# Patient Record
Sex: MALE | Race: WHITE | Employment: FULL TIME | URBAN - METROPOLITAN AREA
[De-identification: names, ages, dates, MRNs, and addresses within clinical notes are randomized per-mention and may not be internally consistent; named-entity substitution may affect disease eponyms.]

---

## 2017-01-30 ENCOUNTER — TELEPHONE (OUTPATIENT)
Dept: INTERNAL MEDICINE CLINIC | Facility: CLINIC | Age: 37
End: 2017-01-30

## 2017-01-30 DIAGNOSIS — Z13.220 SCREENING FOR LIPID DISORDERS: ICD-10-CM

## 2017-01-30 DIAGNOSIS — Z13.228 SCREENING FOR METABOLIC DISORDER: ICD-10-CM

## 2017-01-30 DIAGNOSIS — Z13.29 SCREENING FOR THYROID DISORDER: ICD-10-CM

## 2017-01-30 DIAGNOSIS — Z00.00 ROUTINE GENERAL MEDICAL EXAMINATION AT A HEALTH CARE FACILITY: Primary | ICD-10-CM

## 2017-01-30 DIAGNOSIS — Z13.0 SCREENING FOR DISORDER OF BLOOD AND BLOOD-FORMING ORGANS: ICD-10-CM

## 2017-02-21 ENCOUNTER — LAB ENCOUNTER (OUTPATIENT)
Dept: LAB | Age: 37
End: 2017-02-21
Attending: INTERNAL MEDICINE
Payer: COMMERCIAL

## 2017-02-21 ENCOUNTER — OFFICE VISIT (OUTPATIENT)
Dept: INTERNAL MEDICINE CLINIC | Facility: CLINIC | Age: 37
End: 2017-02-21

## 2017-02-21 VITALS
RESPIRATION RATE: 12 BRPM | TEMPERATURE: 98 F | SYSTOLIC BLOOD PRESSURE: 114 MMHG | WEIGHT: 166 LBS | BODY MASS INDEX: 23.24 KG/M2 | DIASTOLIC BLOOD PRESSURE: 68 MMHG | HEIGHT: 71 IN | HEART RATE: 68 BPM

## 2017-02-21 DIAGNOSIS — Z13.228 SCREENING FOR METABOLIC DISORDER: ICD-10-CM

## 2017-02-21 DIAGNOSIS — Z00.00 ROUTINE GENERAL MEDICAL EXAMINATION AT A HEALTH CARE FACILITY: ICD-10-CM

## 2017-02-21 DIAGNOSIS — Z30.09 VASECTOMY EVALUATION: ICD-10-CM

## 2017-02-21 DIAGNOSIS — Z13.0 SCREENING FOR DISORDER OF BLOOD AND BLOOD-FORMING ORGANS: ICD-10-CM

## 2017-02-21 DIAGNOSIS — Z13.220 SCREENING FOR LIPID DISORDERS: ICD-10-CM

## 2017-02-21 DIAGNOSIS — Z13.29 SCREENING FOR THYROID DISORDER: ICD-10-CM

## 2017-02-21 DIAGNOSIS — Z00.00 PE (PHYSICAL EXAM), ANNUAL: Primary | ICD-10-CM

## 2017-02-21 LAB
ALBUMIN SERPL-MCNC: 3.9 G/DL (ref 3.5–4.8)
ALP LIVER SERPL-CCNC: 62 U/L (ref 45–117)
ALT SERPL-CCNC: 57 U/L (ref 17–63)
AST SERPL-CCNC: 26 U/L (ref 15–41)
BASOPHILS # BLD AUTO: 0.05 X10(3) UL (ref 0–0.1)
BASOPHILS NFR BLD AUTO: 0.9 %
BILIRUB SERPL-MCNC: 0.5 MG/DL (ref 0.1–2)
BUN BLD-MCNC: 11 MG/DL (ref 8–20)
CALCIUM BLD-MCNC: 9.1 MG/DL (ref 8.3–10.3)
CHLORIDE: 106 MMOL/L (ref 101–111)
CHOLEST SMN-MCNC: 183 MG/DL (ref ?–200)
CO2: 31 MMOL/L (ref 22–32)
CREAT BLD-MCNC: 0.91 MG/DL (ref 0.7–1.3)
EOSINOPHIL # BLD AUTO: 0.23 X10(3) UL (ref 0–0.3)
EOSINOPHIL NFR BLD AUTO: 3.9 %
ERYTHROCYTE [DISTWIDTH] IN BLOOD BY AUTOMATED COUNT: 13.2 % (ref 11.5–16)
GLUCOSE BLD-MCNC: 94 MG/DL (ref 70–99)
HCT VFR BLD AUTO: 42.5 % (ref 37–53)
HDLC SERPL-MCNC: 60 MG/DL (ref 45–?)
HDLC SERPL: 3.05 {RATIO} (ref ?–4.97)
HGB BLD-MCNC: 14.1 G/DL (ref 13–17)
IMMATURE GRANULOCYTE COUNT: 0.03 X10(3) UL (ref 0–1)
IMMATURE GRANULOCYTE RATIO %: 0.5 %
LDLC SERPL CALC-MCNC: 105 MG/DL (ref ?–130)
LYMPHOCYTES # BLD AUTO: 2.07 X10(3) UL (ref 0.9–4)
LYMPHOCYTES NFR BLD AUTO: 35.4 %
M PROTEIN MFR SERPL ELPH: 7.4 G/DL (ref 6.1–8.3)
MCH RBC QN AUTO: 30.1 PG (ref 27–33.2)
MCHC RBC AUTO-ENTMCNC: 33.2 G/DL (ref 31–37)
MCV RBC AUTO: 90.8 FL (ref 80–99)
MONOCYTES # BLD AUTO: 0.59 X10(3) UL (ref 0.1–0.6)
MONOCYTES NFR BLD AUTO: 10.1 %
NEUTROPHIL ABS PRELIM: 2.87 X10 (3) UL (ref 1.3–6.7)
NEUTROPHILS # BLD AUTO: 2.87 X10(3) UL (ref 1.3–6.7)
NEUTROPHILS NFR BLD AUTO: 49.2 %
NONHDLC SERPL-MCNC: 123 MG/DL (ref ?–130)
PLATELET # BLD AUTO: 254 10(3)UL (ref 150–450)
POTASSIUM SERPL-SCNC: 4.3 MMOL/L (ref 3.6–5.1)
RBC # BLD AUTO: 4.68 X10(6)UL (ref 4.3–5.7)
RED CELL DISTRIBUTION WIDTH-SD: 43.7 FL (ref 35.1–46.3)
SODIUM SERPL-SCNC: 142 MMOL/L (ref 136–144)
TRIGLYCERIDES: 91 MG/DL (ref ?–150)
TSI SER-ACNC: 2.94 MIU/ML (ref 0.35–5.5)
VLDL: 18 MG/DL (ref 5–40)
WBC # BLD AUTO: 5.8 X10(3) UL (ref 4–13)

## 2017-02-21 PROCEDURE — 80053 COMPREHEN METABOLIC PANEL: CPT

## 2017-02-21 PROCEDURE — 99395 PREV VISIT EST AGE 18-39: CPT | Performed by: INTERNAL MEDICINE

## 2017-02-21 PROCEDURE — 84443 ASSAY THYROID STIM HORMONE: CPT

## 2017-02-21 PROCEDURE — 80061 LIPID PANEL: CPT

## 2017-02-21 PROCEDURE — 85025 COMPLETE CBC W/AUTO DIFF WBC: CPT

## 2017-02-21 NOTE — PROGRESS NOTES
Patient presents with:  Physical      HPI:  Here for cpe. Has intermittent let wirst pain with repetitive use, started 4 mos ago, has not bothered him for a month, no swelling, redness or warmth.      Review of Systems   Constitutional: Negative for fever, DTAP                  10/14/2014      Influenza             10/15/2014        Physical Exam  /68 mmHg  Pulse 68  Temp(Src) 97.8 °F (36.6 °C) (Oral)  Resp 12  Ht 71\"  Wt 166 lb  BMI 23.16 kg/m2  Constitutional: Oriented to person, place, and time.  No

## 2017-06-30 PROBLEM — Z30.09 VASECTOMY EVALUATION: Status: ACTIVE | Noted: 2017-06-30

## 2017-10-24 ENCOUNTER — TELEPHONE (OUTPATIENT)
Dept: INTERNAL MEDICINE CLINIC | Facility: CLINIC | Age: 37
End: 2017-10-24

## 2017-10-24 NOTE — TELEPHONE ENCOUNTER
Pt called back said date of CPE was fine to use on the form.     Pls fax to number on bottom of form

## 2017-10-24 NOTE — TELEPHONE ENCOUNTER
Received Physician Screening Form for lab results, height, weight, B/P. Last PE and labs was 2/21/17. LM for pt to call back to let us know if this is the date that should be used on the form. Form in [facility] triage folder.

## 2017-11-06 ENCOUNTER — TELEPHONE (OUTPATIENT)
Dept: INTERNAL MEDICINE CLINIC | Facility: CLINIC | Age: 37
End: 2017-11-06

## 2017-11-06 NOTE — TELEPHONE ENCOUNTER
Would like to discuss a foot injury, injured his foot running. Not getting any better. Pt did not want to make an appointment with [facility].  Last OV 2/2017

## 2017-11-06 NOTE — TELEPHONE ENCOUNTER
Pt states he was running 4 or so weeks ago, rolled his ankle, area last two toes, side of , has felt sharp pains in the foot with certain movements. Patient referred to Justyna Parnell MD and verbalizes understanding.

## 2017-11-17 ENCOUNTER — TELEPHONE (OUTPATIENT)
Dept: INTERNAL MEDICINE CLINIC | Facility: CLINIC | Age: 37
End: 2017-11-17

## 2017-11-17 NOTE — TELEPHONE ENCOUNTER
Pt stating no c/o any fever at this time. No N/V. No HA. No dizziness. No light sensitivity. Pt states may have slept on it wrong while traveling. Has only tried ibuprofen with no improvement.  Advised may try alternating w/ tylenol for additional pain cont

## 2017-11-17 NOTE — TELEPHONE ENCOUNTER
Patient has developed some neck stiffness over this week. He is scheduled with [facility] on Monday but would like to talk to a nurse to see what he can do over the weekend or if he could go to a walk in.

## 2017-12-15 PROBLEM — M76.71 PERONEAL TENDINITIS OF RIGHT LOWER EXTREMITY: Status: ACTIVE | Noted: 2017-12-15

## 2019-10-08 ENCOUNTER — OFFICE VISIT (OUTPATIENT)
Dept: INTERNAL MEDICINE CLINIC | Facility: CLINIC | Age: 39
End: 2019-10-08
Payer: COMMERCIAL

## 2019-10-08 VITALS
HEIGHT: 72.05 IN | SYSTOLIC BLOOD PRESSURE: 116 MMHG | WEIGHT: 171 LBS | DIASTOLIC BLOOD PRESSURE: 64 MMHG | OXYGEN SATURATION: 98 % | BODY MASS INDEX: 23.16 KG/M2 | HEART RATE: 64 BPM | TEMPERATURE: 98 F | RESPIRATION RATE: 16 BRPM

## 2019-10-08 DIAGNOSIS — Z13.220 SCREENING CHOLESTEROL LEVEL: ICD-10-CM

## 2019-10-08 DIAGNOSIS — Z00.00 LABORATORY EXAMINATION ORDERED AS PART OF A ROUTINE GENERAL MEDICAL EXAMINATION: ICD-10-CM

## 2019-10-08 DIAGNOSIS — Z00.00 PE (PHYSICAL EXAM), ANNUAL: Primary | ICD-10-CM

## 2019-10-08 PROCEDURE — 90686 IIV4 VACC NO PRSV 0.5 ML IM: CPT | Performed by: INTERNAL MEDICINE

## 2019-10-08 PROCEDURE — 99395 PREV VISIT EST AGE 18-39: CPT | Performed by: INTERNAL MEDICINE

## 2019-10-08 PROCEDURE — 90471 IMMUNIZATION ADMIN: CPT | Performed by: INTERNAL MEDICINE

## 2019-10-09 NOTE — PROGRESS NOTES
Patient presents with:  Physical: RG rm 8 Physical, right wrist issues      HPI:  Here for cpe. Amanalee Orozco on right wrsit 3 mos ago, still with some intermittent pain there. No swelling, redness or warmth, hurts with lifitng heavy objects. Normal rom per pt. file.    Allergies  No Known Allergies    Health Maintenance  Immunizations:    Immunization History  Administered            Date(s) Administered    DTAP                  10/14/2014      FLULAVAL 6 months & older 0.5 ml Prefilled syringe (33468) Lipid      TSH W Reflex To Free T4      Flulaval 6 months and older 0.5 ml Quad PF [15339]      Meds & Refills for this Visit:  Requested Prescriptions      No prescriptions requested or ordered in this encounter       Imaging & Consults:  FLULAVAL INFL

## 2019-10-10 ENCOUNTER — APPOINTMENT (OUTPATIENT)
Dept: LAB | Age: 39
End: 2019-10-10
Attending: INTERNAL MEDICINE
Payer: COMMERCIAL

## 2019-10-14 DIAGNOSIS — R79.89 ELEVATED TSH: Primary | ICD-10-CM

## 2021-03-04 ENCOUNTER — TELEPHONE (OUTPATIENT)
Dept: INTERNAL MEDICINE CLINIC | Facility: CLINIC | Age: 41
End: 2021-03-04

## 2021-03-04 DIAGNOSIS — Z13.228 SCREENING FOR METABOLIC DISORDER: ICD-10-CM

## 2021-03-04 DIAGNOSIS — Z00.00 ROUTINE GENERAL MEDICAL EXAMINATION AT A HEALTH CARE FACILITY: Primary | ICD-10-CM

## 2021-03-04 DIAGNOSIS — Z13.0 SCREENING FOR DISORDER OF BLOOD AND BLOOD-FORMING ORGANS: ICD-10-CM

## 2021-03-04 DIAGNOSIS — Z13.29 SCREENING FOR THYROID DISORDER: ICD-10-CM

## 2021-03-04 DIAGNOSIS — Z13.220 SCREENING FOR LIPID DISORDERS: ICD-10-CM

## 2021-03-04 NOTE — TELEPHONE ENCOUNTER
[facility] to [facility]- Pt informed that labs need to be completed no sooner than 2 weeks prior to the appt.  Pt aware to fast-no call back required      Future Appointments   Date Time Provider Kumar Groves   5/21/2021  7:30 AM Seble Torres MD [facility]

## 2021-03-08 ENCOUNTER — ORDER TRANSCRIPTION (OUTPATIENT)
Dept: ADMINISTRATIVE | Facility: HOSPITAL | Age: 41
End: 2021-03-08

## 2021-03-08 DIAGNOSIS — Z13.9 ENCOUNTER FOR SCREENING: Primary | ICD-10-CM

## 2021-04-01 ENCOUNTER — ORDER TRANSCRIPTION (OUTPATIENT)
Dept: ADMINISTRATIVE | Facility: HOSPITAL | Age: 41
End: 2021-04-01

## 2021-04-01 DIAGNOSIS — R93.1 ABNORMAL SCREENING CARDIAC CT: Primary | ICD-10-CM

## 2021-04-02 ENCOUNTER — HOSPITAL ENCOUNTER (OUTPATIENT)
Dept: CT IMAGING | Facility: HOSPITAL | Age: 41
Discharge: HOME | End: 2021-04-02
Attending: INTERNAL MEDICINE

## 2021-04-02 DIAGNOSIS — Z13.9 ENCOUNTER FOR SCREENING: ICD-10-CM

## 2021-04-02 DIAGNOSIS — R93.1 ABNORMAL SCREENING CARDIAC CT: ICD-10-CM

## 2021-05-21 ENCOUNTER — OFFICE VISIT (OUTPATIENT)
Dept: INTERNAL MEDICINE CLINIC | Facility: CLINIC | Age: 41
End: 2021-05-21
Payer: COMMERCIAL

## 2021-05-21 VITALS
WEIGHT: 177.63 LBS | TEMPERATURE: 97 F | SYSTOLIC BLOOD PRESSURE: 110 MMHG | BODY MASS INDEX: 24.06 KG/M2 | RESPIRATION RATE: 18 BRPM | HEIGHT: 72.05 IN | DIASTOLIC BLOOD PRESSURE: 74 MMHG

## 2021-05-21 DIAGNOSIS — Z00.00 PE (PHYSICAL EXAM), ANNUAL: Primary | ICD-10-CM

## 2021-05-21 PROCEDURE — 3078F DIAST BP <80 MM HG: CPT | Performed by: INTERNAL MEDICINE

## 2021-05-21 PROCEDURE — 3008F BODY MASS INDEX DOCD: CPT | Performed by: INTERNAL MEDICINE

## 2021-05-21 PROCEDURE — 99396 PREV VISIT EST AGE 40-64: CPT | Performed by: INTERNAL MEDICINE

## 2021-05-21 PROCEDURE — 3074F SYST BP LT 130 MM HG: CPT | Performed by: INTERNAL MEDICINE

## 2021-05-21 NOTE — PROGRESS NOTES
Patient presents with:  Wellness Visit: MR rm 9 annual pe       HPI:  Here for cpe. Review of Systems   Constitutional: Negative for fever, chills and fatigue. No distress.   HENT: Negative for hearing loss, congestion, sore throat, neck pain and dental 100 mcg/0.5 ml                          03/24/2021 04/21/2021      DTAP                  10/14/2014      FLULAVAL 6 months & older 0.5 ml Prefilled syringe ([id number])                          10/08/2019      Influenza             10/15/2014      Physical Exa the patient understands the plan.

## 2021-11-05 ENCOUNTER — IMMUNIZATION (OUTPATIENT)
Dept: LAB | Facility: HOSPITAL | Age: 41
End: 2021-11-05
Attending: EMERGENCY MEDICINE
Payer: COMMERCIAL

## 2021-11-05 DIAGNOSIS — Z23 NEED FOR VACCINATION: Primary | ICD-10-CM

## 2021-11-05 PROCEDURE — 0064A SARSCOV2 VAC 50MCG/0.25ML IM: CPT

## 2021-12-21 ENCOUNTER — APPOINTMENT (OUTPATIENT)
Dept: GENERAL RADIOLOGY | Age: 41
End: 2021-12-21
Attending: PHYSICIAN ASSISTANT
Payer: COMMERCIAL

## 2021-12-21 ENCOUNTER — HOSPITAL ENCOUNTER (OUTPATIENT)
Age: 41
Discharge: HOME | End: 2021-12-21
Payer: COMMERCIAL

## 2021-12-21 ENCOUNTER — TELEPHONE (OUTPATIENT)
Dept: INTERNAL MEDICINE CLINIC | Facility: CLINIC | Age: 41
End: 2021-12-21

## 2021-12-21 VITALS
TEMPERATURE: 98 F | BODY MASS INDEX: 23.7 KG/M2 | HEART RATE: 84 BPM | HEIGHT: 72 IN | SYSTOLIC BLOOD PRESSURE: 132 MMHG | OXYGEN SATURATION: 99 % | WEIGHT: 175 LBS | RESPIRATION RATE: 18 BRPM | DIASTOLIC BLOOD PRESSURE: 74 MMHG

## 2021-12-21 DIAGNOSIS — S20.211A RIB CONTUSION, RIGHT, INITIAL ENCOUNTER: Primary | ICD-10-CM

## 2021-12-21 PROCEDURE — 71101 X-RAY EXAM UNILAT RIBS/CHEST: CPT | Performed by: PHYSICIAN ASSISTANT

## 2021-12-21 PROCEDURE — 99203 OFFICE O/P NEW LOW 30 MIN: CPT

## 2021-12-21 PROCEDURE — 99213 OFFICE O/P EST LOW 20 MIN: CPT

## 2021-12-21 RX ORDER — IBUPROFEN 600 MG/1
600 TABLET ORAL ONCE
Status: COMPLETED | OUTPATIENT
Start: 2021-12-21 | End: 2021-12-21

## 2021-12-21 RX ORDER — LIDOCAINE 50 MG/G
1 PATCH TOPICAL EVERY 24 HOURS
Qty: 5 PATCH | Refills: 0 | Status: SHIPPED | OUTPATIENT
Start: 2021-12-21 | End: 2022-01-05

## 2021-12-21 NOTE — ED PROVIDER NOTES
Patient Seen in: [facility]      History   Patient presents with:   Other: righ ribcage pain     Stated Complaint: sharp pain,ribs right side    Subjective:   HPI    49-year-old male here with complaint of pain and swelling along the base Normocephalic.       Right Ear: External ear normal.      Left Ear: External ear normal.      Nose: Nose normal.      Mouth/Throat:      Mouth: Mucous membranes are moist.   Eyes:      Conjunctiva/sclera: Conjunctivae normal.      Pupils: Pupils are equal, personally reviewed the xray images and radiology report and discussed the findings with the patient : . No acute displaced right rib fracture              MDM     Clinical Impression: R sided rib contusion  Course of Treatment: Take Motrin every 6 hours a

## 2021-12-21 NOTE — TELEPHONE ENCOUNTER
Patient states over the weekend having right sided rib pain after playing with kids (football, trampoline) was pretty active this weekend. Sunday noticed sharp pain with movement in localized location, can pinpoint tenderness/pain. Patient states pain when taking a deep breath. No SOB or cp. Patient speaking in full sentences during call. Patient reports 5/10 at this time, has been trying ice, heat, NSAIDS with minimal improvement. Patient states he is breathing more shallow to help reduce the pain. Patient recommended to be seen in [facility] for evaluation and imaging. Notified risk of developing pneumonia if not breathing properly. Patient agreeable to [facility] at this time.  Patient is due for NSAID dosing at this time and will redose prior to being seen in [facility]

## 2022-12-11 ENCOUNTER — HOSPITAL ENCOUNTER (OUTPATIENT)
Age: 42
Discharge: HOME | End: 2022-12-11
Payer: COMMERCIAL

## 2022-12-11 VITALS
BODY MASS INDEX: 24.5 KG/M2 | TEMPERATURE: 98 F | RESPIRATION RATE: 20 BRPM | HEART RATE: 80 BPM | HEIGHT: 71 IN | SYSTOLIC BLOOD PRESSURE: 125 MMHG | DIASTOLIC BLOOD PRESSURE: 75 MMHG | WEIGHT: 175 LBS | OXYGEN SATURATION: 98 %

## 2022-12-11 DIAGNOSIS — J40 SINOBRONCHITIS: Primary | ICD-10-CM

## 2022-12-11 DIAGNOSIS — J32.9 SINOBRONCHITIS: Primary | ICD-10-CM

## 2022-12-11 PROCEDURE — 99213 OFFICE O/P EST LOW 20 MIN: CPT

## 2022-12-11 RX ORDER — BENZONATATE 100 MG/1
100 CAPSULE ORAL 3 TIMES DAILY PRN
Qty: 30 CAPSULE | Refills: 0 | Status: SHIPPED | OUTPATIENT
Start: 2022-12-11 | End: 2022-12-11

## 2022-12-11 RX ORDER — BENZONATATE 100 MG/1
100 CAPSULE ORAL 3 TIMES DAILY PRN
Qty: 30 CAPSULE | Refills: 0 | Status: SHIPPED | OUTPATIENT
Start: 2022-12-11 | End: 2023-01-10

## 2022-12-11 RX ORDER — AMOXICILLIN AND CLAVULANATE POTASSIUM 875; 125 MG/1; MG/1
1 TABLET, FILM COATED ORAL 2 TIMES DAILY
Qty: 20 TABLET | Refills: 0 | Status: SHIPPED | OUTPATIENT
Start: 2022-12-11 | End: 2022-12-21

## 2022-12-11 RX ORDER — AMOXICILLIN AND CLAVULANATE POTASSIUM 875; 125 MG/1; MG/1
1 TABLET, FILM COATED ORAL 2 TIMES DAILY
Qty: 20 TABLET | Refills: 0 | Status: SHIPPED | OUTPATIENT
Start: 2022-12-11 | End: 2022-12-11

## 2022-12-11 NOTE — ED INITIAL ASSESSMENT (HPI)
Had flu symptoms about 2 weeks ago, dx with Flu A almost 2 weeks ago. Reports continued sinus congestion, coughing at night.

## 2022-12-11 NOTE — DISCHARGE INSTRUCTIONS
Please take Augmentin as prescribed. Tessalon Perles for coughing. Over-the-counter antihistamines like Zyrtec and Flonase nasal.  Be helpful.   Follow closely with your primary

## 2023-02-03 ENCOUNTER — TELEPHONE (OUTPATIENT)
Dept: INTERNAL MEDICINE CLINIC | Facility: CLINIC | Age: 43
End: 2023-02-03

## 2023-02-03 DIAGNOSIS — Z13.0 SCREENING FOR DISORDER OF BLOOD AND BLOOD-FORMING ORGANS: ICD-10-CM

## 2023-02-03 DIAGNOSIS — Z13.228 SCREENING FOR METABOLIC DISORDER: ICD-10-CM

## 2023-02-03 DIAGNOSIS — Z13.220 SCREENING FOR LIPID DISORDERS: ICD-10-CM

## 2023-02-03 DIAGNOSIS — Z00.00 ROUTINE GENERAL MEDICAL EXAMINATION AT A HEALTH CARE FACILITY: Primary | ICD-10-CM

## 2023-02-03 DIAGNOSIS — Z13.29 SCREENING FOR THYROID DISORDER: ICD-10-CM

## 2023-02-03 NOTE — TELEPHONE ENCOUNTER
Orders to [facility] Pt aware to get labs done no sooner than 2 weeks prior to the appt. Pt aware to fast.  No call back required.   Future Appointments   Date Time Provider Kumar Groves   2/22/2023  8:00 AM Maranda Bautista PA-C [facility]

## 2023-02-13 ENCOUNTER — TELEPHONE (OUTPATIENT)
Dept: INTERNAL MEDICINE CLINIC | Facility: CLINIC | Age: 43
End: 2023-02-13

## 2023-02-13 DIAGNOSIS — Z13.0 SCREENING FOR DISORDER OF BLOOD AND BLOOD-FORMING ORGANS: ICD-10-CM

## 2023-02-13 DIAGNOSIS — Z13.228 SCREENING FOR METABOLIC DISORDER: ICD-10-CM

## 2023-02-13 DIAGNOSIS — Z00.00 ROUTINE GENERAL MEDICAL EXAMINATION AT A HEALTH CARE FACILITY: ICD-10-CM

## 2023-02-13 DIAGNOSIS — Z13.220 SCREENING FOR LIPID DISORDERS: ICD-10-CM

## 2023-02-13 NOTE — TELEPHONE ENCOUNTER
CPE   Future Appointments   Date Time Provider Kumar Groves   2/24/2023  8:40 AM Serge Gonzales MD [facility]      Informed must fast no call back required.  Orders to    [facility]

## 2023-02-22 LAB
ABSOLUTE BASOPHILS: 38 CELLS/UL (ref 0–200)
ABSOLUTE EOSINOPHILS: 200 CELLS/UL (ref 15–500)
ABSOLUTE LYMPHOCYTES: 2025 CELLS/UL (ref 850–3900)
ABSOLUTE MONOCYTES: 626 CELLS/UL (ref 200–950)
ABSOLUTE NEUTROPHILS: 2511 CELLS/UL (ref 1500–7800)
ALBUMIN/GLOBULIN RATIO: 1.6 (CALC) (ref 1–2.5)
ALBUMIN: 4.4 G/DL (ref 3.6–5.1)
ALKALINE PHOSPHATASE: 68 U/L (ref 36–130)
ALT: 53 U/L (ref 9–46)
AST: 29 U/L (ref 10–40)
BASOPHILS: 0.7 %
BILIRUBIN, TOTAL: 0.5 MG/DL (ref 0.2–1.2)
BUN: 10 MG/DL (ref 7–25)
CALCIUM: 9.5 MG/DL (ref 8.6–10.3)
CARBON DIOXIDE: 28 MMOL/L (ref 20–32)
CHLORIDE: 103 MMOL/L (ref 98–110)
CHOL/HDLC RATIO: 4.3 (CALC)
CHOLESTEROL, TOTAL: 213 MG/DL
CREATININE: 0.86 MG/DL (ref 0.6–1.29)
EGFR: 111 ML/MIN/1.73M2
EOSINOPHILS: 3.7 %
GLOBULIN: 2.7 G/DL (CALC) (ref 1.9–3.7)
GLUCOSE: 85 MG/DL (ref 65–99)
HDL CHOLESTEROL: 50 MG/DL
HEMATOCRIT: 41.5 % (ref 38.5–50)
HEMOGLOBIN: 13.9 G/DL (ref 13.2–17.1)
LDL-CHOLESTEROL: 142 MG/DL (CALC)
LYMPHOCYTES: 37.5 %
MCH: 30 PG (ref 27–33)
MCHC: 33.5 G/DL (ref 32–36)
MCV: 89.6 FL (ref 80–100)
MONOCYTES: 11.6 %
MPV: 10.3 FL (ref 7.5–12.5)
NEUTROPHILS: 46.5 %
NON-HDL CHOLESTEROL: 163 MG/DL (CALC)
PLATELET COUNT: 252 THOUSAND/UL (ref 140–400)
POTASSIUM: 4.4 MMOL/L (ref 3.5–5.3)
PROTEIN, TOTAL: 7.1 G/DL (ref 6.1–8.1)
RDW: 13 % (ref 11–15)
RED BLOOD CELL COUNT: 4.63 MILLION/UL (ref 4.2–5.8)
SODIUM: 138 MMOL/L (ref 135–146)
TRIGLYCERIDES: 100 MG/DL
TSH W/REFLEX TO FT4: 3.39 MIU/L (ref 0.4–4.5)
WHITE BLOOD CELL COUNT: 5.4 THOUSAND/UL (ref 3.8–10.8)

## 2023-02-24 ENCOUNTER — OFFICE VISIT (OUTPATIENT)
Dept: INTERNAL MEDICINE CLINIC | Facility: CLINIC | Age: 43
End: 2023-02-24
Payer: COMMERCIAL

## 2023-02-24 VITALS
BODY MASS INDEX: 24.87 KG/M2 | RESPIRATION RATE: 18 BRPM | WEIGHT: 177.63 LBS | HEIGHT: 71 IN | OXYGEN SATURATION: 99 % | DIASTOLIC BLOOD PRESSURE: 80 MMHG | HEART RATE: 74 BPM | SYSTOLIC BLOOD PRESSURE: 112 MMHG

## 2023-02-24 DIAGNOSIS — E78.00 HYPERCHOLESTEREMIA: ICD-10-CM

## 2023-02-24 DIAGNOSIS — Z00.00 WELLNESS EXAMINATION: Primary | ICD-10-CM

## 2023-02-24 PROCEDURE — 3079F DIAST BP 80-89 MM HG: CPT | Performed by: FAMILY MEDICINE

## 2023-02-24 PROCEDURE — 99396 PREV VISIT EST AGE 40-64: CPT | Performed by: FAMILY MEDICINE

## 2023-02-24 PROCEDURE — 3008F BODY MASS INDEX DOCD: CPT | Performed by: FAMILY MEDICINE

## 2023-02-24 PROCEDURE — 3074F SYST BP LT 130 MM HG: CPT | Performed by: FAMILY MEDICINE

## 2023-10-05 ENCOUNTER — OFFICE VISIT (OUTPATIENT)
Dept: INTERNAL MEDICINE CLINIC | Facility: CLINIC | Age: 43
End: 2023-10-05
Payer: COMMERCIAL

## 2023-10-05 VITALS
DIASTOLIC BLOOD PRESSURE: 70 MMHG | WEIGHT: 181 LBS | OXYGEN SATURATION: 99 % | TEMPERATURE: 97 F | HEART RATE: 79 BPM | BODY MASS INDEX: 25 KG/M2 | SYSTOLIC BLOOD PRESSURE: 100 MMHG

## 2023-10-05 DIAGNOSIS — J01.90 ACUTE NON-RECURRENT SINUSITIS, UNSPECIFIED LOCATION: Primary | ICD-10-CM

## 2023-10-05 PROCEDURE — 99213 OFFICE O/P EST LOW 20 MIN: CPT | Performed by: FAMILY MEDICINE

## 2023-10-05 PROCEDURE — 3074F SYST BP LT 130 MM HG: CPT | Performed by: FAMILY MEDICINE

## 2023-10-05 PROCEDURE — 3078F DIAST BP <80 MM HG: CPT | Performed by: FAMILY MEDICINE

## 2023-10-05 RX ORDER — METHYLPREDNISOLONE 4 MG/1
TABLET ORAL
Qty: 1 EACH | Refills: 0 | Status: SHIPPED | OUTPATIENT
Start: 2023-10-05

## 2023-10-05 RX ORDER — AMOXICILLIN AND CLAVULANATE POTASSIUM 875; 125 MG/1; MG/1
1 TABLET, FILM COATED ORAL 2 TIMES DAILY
Qty: 14 TABLET | Refills: 0 | Status: SHIPPED | OUTPATIENT
Start: 2023-10-05 | End: 2023-10-12

## 2024-01-26 ENCOUNTER — TELEPHONE (OUTPATIENT)
Dept: INTERNAL MEDICINE CLINIC | Facility: CLINIC | Age: 44
End: 2024-01-26

## 2024-01-26 DIAGNOSIS — Z13.0 SCREENING FOR DISORDER OF BLOOD AND BLOOD-FORMING ORGANS: ICD-10-CM

## 2024-01-26 DIAGNOSIS — Z13.29 SCREENING FOR THYROID DISORDER: ICD-10-CM

## 2024-01-26 DIAGNOSIS — Z13.228 SCREENING FOR METABOLIC DISORDER: ICD-10-CM

## 2024-01-26 DIAGNOSIS — Z00.00 ROUTINE GENERAL MEDICAL EXAMINATION AT A HEALTH CARE FACILITY: Primary | ICD-10-CM

## 2024-01-26 DIAGNOSIS — Z13.220 SCREENING FOR LIPID DISORDERS: ICD-10-CM

## 2024-01-26 NOTE — TELEPHONE ENCOUNTER
Future Appointments   Date Time Provider Department Center   2/16/2024  9:00 AM Serge Mayorga MD [facility]     Orders to [facility]- Pt informed that labs need to be completed no sooner than 2 weeks prior to the appt. Pt aware to fast-no call back required

## 2024-08-30 ENCOUNTER — PATIENT MESSAGE (OUTPATIENT)
Dept: INTERNAL MEDICINE CLINIC | Facility: CLINIC | Age: 44
End: 2024-08-30

## 2024-08-30 DIAGNOSIS — Z12.83 ENCOUNTER FOR SCREENING FOR MALIGNANT NEOPLASM OF SKIN: Primary | ICD-10-CM

## 2024-09-03 NOTE — TELEPHONE ENCOUNTER
From: Ciaran Rhodes  To: Serge Mayorga  Sent: 8/30/2024 11:57 AM CDT  Subject: Dematologist Referral    Dr. Mayorga,    Sarita. I have schedule my annual physical exam appointment with you in early October. In advance, can you provide me with a referral for Dematologist? Looking to do an annual skin check for any abnormalities (preventative).    Thanks,  Ciaran  [phone]

## 2024-10-09 ENCOUNTER — LABORATORY ENCOUNTER (OUTPATIENT)
Dept: LAB | Age: 44
End: 2024-10-09
Attending: FAMILY MEDICINE
Payer: COMMERCIAL

## 2024-10-09 LAB
ALBUMIN SERPL-MCNC: 4.4 G/DL (ref 3.2–4.8)
ALBUMIN/GLOB SERPL: 1.5 {RATIO} (ref 1–2)
ALP LIVER SERPL-CCNC: 61 U/L
ALT SERPL-CCNC: 37 U/L
ANION GAP SERPL CALC-SCNC: 2 MMOL/L (ref 0–18)
AST SERPL-CCNC: 23 U/L (ref ?–34)
BASOPHILS # BLD AUTO: 0.04 X10(3) UL (ref 0–0.2)
BASOPHILS NFR BLD AUTO: 0.7 %
BILIRUB SERPL-MCNC: 0.6 MG/DL (ref 0.3–1.2)
BUN BLD-MCNC: 14 MG/DL (ref 9–23)
CALCIUM BLD-MCNC: 10 MG/DL (ref 8.7–10.4)
CHLORIDE SERPL-SCNC: 105 MMOL/L (ref 98–112)
CHOLEST SERPL-MCNC: 204 MG/DL (ref ?–200)
CO2 SERPL-SCNC: 30 MMOL/L (ref 21–32)
CREAT BLD-MCNC: 0.92 MG/DL
EGFRCR SERPLBLD CKD-EPI 2021: 106 ML/MIN/1.73M2 (ref 60–?)
EOSINOPHIL # BLD AUTO: 0.12 X10(3) UL (ref 0–0.7)
EOSINOPHIL NFR BLD AUTO: 2.2 %
ERYTHROCYTE [DISTWIDTH] IN BLOOD BY AUTOMATED COUNT: 13.3 %
FASTING PATIENT LIPID ANSWER: YES
FASTING STATUS PATIENT QL REPORTED: YES
GLOBULIN PLAS-MCNC: 2.9 G/DL (ref 2–3.5)
GLUCOSE BLD-MCNC: 97 MG/DL (ref 70–99)
HCT VFR BLD AUTO: 43.4 %
HDLC SERPL-MCNC: 47 MG/DL (ref 40–59)
HGB BLD-MCNC: 14.4 G/DL
IMM GRANULOCYTES # BLD AUTO: 0.02 X10(3) UL (ref 0–1)
IMM GRANULOCYTES NFR BLD: 0.4 %
LDLC SERPL CALC-MCNC: 138 MG/DL (ref ?–100)
LYMPHOCYTES # BLD AUTO: 1.93 X10(3) UL (ref 1–4)
LYMPHOCYTES NFR BLD AUTO: 35.5 %
MCH RBC QN AUTO: 30.6 PG (ref 26–34)
MCHC RBC AUTO-ENTMCNC: 33.2 G/DL (ref 31–37)
MCV RBC AUTO: 92.1 FL
MONOCYTES # BLD AUTO: 0.62 X10(3) UL (ref 0.1–1)
MONOCYTES NFR BLD AUTO: 11.4 %
NEUTROPHILS # BLD AUTO: 2.7 X10 (3) UL (ref 1.5–7.7)
NEUTROPHILS # BLD AUTO: 2.7 X10(3) UL (ref 1.5–7.7)
NEUTROPHILS NFR BLD AUTO: 49.8 %
NONHDLC SERPL-MCNC: 157 MG/DL (ref ?–130)
OSMOLALITY SERPL CALC.SUM OF ELEC: 284 MOSM/KG (ref 275–295)
PLATELET # BLD AUTO: 259 10(3)UL (ref 150–450)
POTASSIUM SERPL-SCNC: 4.3 MMOL/L (ref 3.5–5.1)
PROT SERPL-MCNC: 7.3 G/DL (ref 5.7–8.2)
RBC # BLD AUTO: 4.71 X10(6)UL
SODIUM SERPL-SCNC: 137 MMOL/L (ref 136–145)
TRIGL SERPL-MCNC: 105 MG/DL (ref 30–149)
TSI SER-ACNC: 1.94 MIU/ML (ref 0.55–4.78)
VLDLC SERPL CALC-MCNC: 19 MG/DL (ref 0–30)
WBC # BLD AUTO: 5.4 X10(3) UL (ref 4–11)

## 2024-10-09 PROCEDURE — 80061 LIPID PANEL: CPT | Performed by: FAMILY MEDICINE

## 2024-10-09 PROCEDURE — 80053 COMPREHEN METABOLIC PANEL: CPT | Performed by: FAMILY MEDICINE

## 2024-10-09 PROCEDURE — 36415 COLL VENOUS BLD VENIPUNCTURE: CPT | Performed by: FAMILY MEDICINE

## 2024-10-09 PROCEDURE — 84443 ASSAY THYROID STIM HORMONE: CPT | Performed by: FAMILY MEDICINE

## 2024-10-09 PROCEDURE — 85025 COMPLETE CBC W/AUTO DIFF WBC: CPT | Performed by: FAMILY MEDICINE

## 2024-10-11 ENCOUNTER — OFFICE VISIT (OUTPATIENT)
Dept: INTERNAL MEDICINE CLINIC | Facility: CLINIC | Age: 44
End: 2024-10-11
Payer: COMMERCIAL

## 2024-10-11 VITALS
TEMPERATURE: 97 F | WEIGHT: 176.81 LBS | HEIGHT: 72.84 IN | OXYGEN SATURATION: 99 % | RESPIRATION RATE: 16 BRPM | SYSTOLIC BLOOD PRESSURE: 100 MMHG | HEART RATE: 76 BPM | BODY MASS INDEX: 23.43 KG/M2 | DIASTOLIC BLOOD PRESSURE: 62 MMHG

## 2024-10-11 DIAGNOSIS — Z00.00 WELLNESS EXAMINATION: Primary | ICD-10-CM

## 2024-10-11 DIAGNOSIS — E78.00 ELEVATED LDL CHOLESTEROL LEVEL: ICD-10-CM

## 2024-10-11 DIAGNOSIS — R06.83 SNORING: ICD-10-CM

## 2024-10-11 PROCEDURE — 99396 PREV VISIT EST AGE 40-64: CPT | Performed by: FAMILY MEDICINE

## 2024-10-11 NOTE — PROGRESS NOTES
Ciaran Rhodes  11/17/1980    Chief Complaint   Patient presents with    Physical     ES rm - 1 - Physical       HPI:   Ciaran Rhodes is a 43 year old male who presents for CPE. He has struggled with staying asleep most nights. He has been trying to optimize sleep hygiene. He does snore. He has been under more stress at work. Has not yet established a consistent exercise routine.     No current outpatient medications on file.      Allergies[1]   History reviewed. No pertinent past medical history.   Patient Active Problem List   Diagnosis   (none) - all problems resolved or deleted      History reviewed. No pertinent surgical history.   Family History   Problem Relation Age of Onset    Hypertension Mother     Lipids Mother     Other (Other) Mother         depression, cva    Heart Disorder Paternal Grandfather     Cancer Paternal Grandfather         bladder ca      Social History     Socioeconomic History    Marital status:    Tobacco Use    Smoking status: Never     Passive exposure: Never    Smokeless tobacco: Never   Vaping Use    Vaping status: Never Used   Substance and Sexual Activity    Alcohol use: Yes     Alcohol/week: 7.0 standard drinks of alcohol     Types: 7 Standard drinks or equivalent per week     Comment: cage 2/21/17    Drug use: No   Other Topics Concern    Exercise Yes    Stress Concern Yes         REVIEW OF SYSTEMS:   GENERAL: feels well otherwise  SKIN: no rashes  EYES: no new vision changes  HEENT: not congested  LUNGS: no new dyspnea  CARDIOVASCULAR: no new chest pain  GI: no new abdominal pain  NEURO: no headaches    EXAM:   /62 (BP Location: Left arm, Patient Position: Sitting, Cuff Size: large)   Pulse 76   Temp 97 °F (36.1 °C) (Temporal)   Resp 16   Ht 6' 0.84\" (1.85 m)   Wt 176 lb 12.8 oz (80.2 kg)   SpO2 99%   BMI 23.43 kg/m²   GENERAL: Well developed, well nourished,in no apparent distress  SKIN: No rashes,no suspicious lesions  EYES: PERRLA, EOMI, conjunctiva are  clear  HEENT: atraumatic, normocephalic,ears and throat are clear  NECK: supple,no adenopathy,no bruits  LUNGS: clear to auscultation  CARDIO: RRR without murmur  GI: good BS's,no masses, HSM or tenderness    ASSESSMENT AND PLAN:   Ciaran Rhodes is a 43 year old male who presents for CPE    Wellness examination  Discussed age appropriate health and wellness.     Snoring  Continue emphasis on optimal sleep hygiene. Discussed trial of magnesium and Ashwagandha supplementation. Will further evaluate snoring with home sleep study.  - Home Sleep Apnea Test (Adult pt only) - Sleep consult required for Medicare pts  - General sleep study; Future    Elevated LDL cholesterol level  Discussed optimal exercise goals. Continue healthy diet. Low ASCVD risk. Monitory yearly.       All questions were answered and the patient agrees with the plan.     Thank you,  Serge Mayorga MD         [1] No Known Allergies

## 2024-10-23 ENCOUNTER — OFFICE VISIT (OUTPATIENT)
Dept: SLEEP CENTER | Age: 44
End: 2024-10-23
Attending: INTERNAL MEDICINE
Payer: COMMERCIAL

## 2024-10-23 DIAGNOSIS — R06.83 SNORING: ICD-10-CM

## 2024-10-23 PROCEDURE — 95806 SLEEP STUDY UNATT&RESP EFFT: CPT

## 2024-10-28 ENCOUNTER — SLEEP STUDY (OUTPATIENT)
Facility: CLINIC | Age: 44
End: 2024-10-28
Payer: COMMERCIAL

## 2024-10-28 DIAGNOSIS — G47.30 SLEEP APNEA, UNSPECIFIED TYPE: Primary | ICD-10-CM

## 2024-10-28 PROCEDURE — 95806 SLEEP STUDY UNATT&RESP EFFT: CPT | Performed by: INTERNAL MEDICINE

## 2024-10-28 NOTE — PROCEDURES
[facility] [address]       Accredited by the American Academy of Sleep Medicine (AASM)    PATIENT'S NAME:        DANIEL NAIR  ATTENDING PHYSICIAN:   Christian Daniel MD  REFERRING PHYSICIAN:   Serge Mayorga MD  PATIENT ACCOUNT #:     [id number]        LOCATION:       [facility]  MEDICAL RECORD #:      [id number]        YOB: 1980  DATE OF STUDY:         10/23/2024    SLEEP STUDY REPORT    STUDY TYPE:  Unattended sleep study.    PATIENT CHARACTERISTICS:  Age 43 years.  Gender male.    HISTORY:  The patient is a 43-year-old male with history of snoring.  This home sleep study is performed for evaluation of obstructive sleep apnea syndrome.    UNATTENDED SLEEP STUDY RECORDING PARAMETERS:  The patient underwent a formal technically adequate unattended diagnostic sleep study coordinated with the [facility].  The study was performed in accordance with the AASM standard for Out of Center Sleep Testing.  The four-channel Type III HST measures the following parameters:  flow, respiratory effort, pulse, and oxygen saturation.    SCORING:  This study was scored in accordance with AASM scoring rules and Medicare rule 1B.    FINDINGS:  The flow evaluation time began at 9:55 p.m. and ended at 7:11 a.m. with a duration of 9 hours and 5 minutes.  Overall apnea-hypopnea index was 0.1 events per hour.  Supine AHI was 0.0 events per hour.  SpO2 thee was 92%.  Minimal snoring was noted during the sleep study recording.  Average heart rate was 60 beats per minute and maximum heart rate of 106 beats per minute.    IMPRESSION:  Overall apnea-hypopnea index of 0.1 events per hour is consistent with no evidence of obstructive sleep apnea syndrome.  SpO2 thee was 92%.     DIAGNOSIS:  Sleep apnea, unspecified (G47.30).    PLAN:    1.   Based on this study, sleep-disordered breathing cannot be identified.  Weight gain, alcohol consumption, and time spent sleeping supine can affect  upper airway resistance.  Future sleep testing should be considered if risk factors change.  2.   If obstructive sleep apnea is a strongly suspected, consider repeating a home sleep study or performing an attended sleep study in sleep lab.    Dictated By Christian Daniel MD  d: 10/27/2024 19:29:46  t: 10/28/2024 11:49:40  [facility] [id number]  [facility]/    cc: Serge Mayorga MD

## 2024-12-12 ENCOUNTER — MED REC SCAN ONLY (OUTPATIENT)
Dept: INTERNAL MEDICINE CLINIC | Facility: CLINIC | Age: 44
End: 2024-12-12

## 2024-12-13 ENCOUNTER — HOSPITAL ENCOUNTER (OUTPATIENT)
Age: 44
Discharge: HOME | End: 2024-12-13
Attending: EMERGENCY MEDICINE
Payer: COMMERCIAL

## 2024-12-13 ENCOUNTER — NURSE TRIAGE (OUTPATIENT)
Dept: INTERNAL MEDICINE CLINIC | Facility: CLINIC | Age: 44
End: 2024-12-13

## 2024-12-13 ENCOUNTER — E-VISIT (OUTPATIENT)
Dept: TELEHEALTH | Age: 44
End: 2024-12-13
Payer: COMMERCIAL

## 2024-12-13 VITALS
SYSTOLIC BLOOD PRESSURE: 125 MMHG | TEMPERATURE: 98 F | HEART RATE: 103 BPM | HEIGHT: 71 IN | OXYGEN SATURATION: 98 % | DIASTOLIC BLOOD PRESSURE: 77 MMHG | BODY MASS INDEX: 24.5 KG/M2 | WEIGHT: 175 LBS | RESPIRATION RATE: 16 BRPM

## 2024-12-13 DIAGNOSIS — J01.10 ACUTE NON-RECURRENT FRONTAL SINUSITIS: Primary | ICD-10-CM

## 2024-12-13 DIAGNOSIS — Z02.9 ADMINISTRATIVE ENCOUNTER: Primary | ICD-10-CM

## 2024-12-13 DIAGNOSIS — H66.002 NON-RECURRENT ACUTE SUPPURATIVE OTITIS MEDIA OF LEFT EAR WITHOUT SPONTANEOUS RUPTURE OF TYMPANIC MEMBRANE: ICD-10-CM

## 2024-12-13 PROCEDURE — 99213 OFFICE O/P EST LOW 20 MIN: CPT

## 2024-12-13 NOTE — TELEPHONE ENCOUNTER
Action Requested: Summary for Provider     []  Critical Lab, Recommendations Needed  [x] Need Additional Advice  []   FYI    []   Need Orders  [] Need Medications Sent to Pharmacy  []  Other     SUMMARY: Received call from pt. Patient stated he started with a headcold before thanksgiving, not feels he has sinus infection which he usually gets this time of year. Sinus pain/pressure and pressure in ears. Green/yellow nasal discharge. Afebrile. Patient has been taking sudafed and trying to wait to see if sxs improve, but sxs seem to be worsening now. Informed him we would need to see him for eval prior to prescribing meds, pt asking for appointment today. No appts available, informed him we can ask Dr. Mayorga if he is able to see him. If not, would recommend [facility]. Patient would like to see if Dr. Mayorga can see him.     Dr. Mayorga- would you be able to see pt for sinus sxs or advise to be seen in [facility]? Declined appointment for Monday.     Reason for call: Sinus Problem  Onset: before thanksgiving         Reason for Disposition   Earache    Protocols used: Sinus Pain and Congestion-A-[place]

## 2024-12-13 NOTE — ED PROVIDER NOTES
Patient Seen in: [facility]      History     Chief Complaint   Patient presents with    Cold     Sinus infection symptoms - Entered by patient     Stated Complaint: Cold - Sinus infection symptoms    Subjective:   HPI      45 yo male with two to three weeks of sinus congestion and drainage with worsening this week and left ear pain. No fever.     Objective:     History reviewed. No pertinent past medical history.           History reviewed. No pertinent surgical history.             Social History     Socioeconomic History    Marital status:    Tobacco Use    Smoking status: Never     Passive exposure: Never    Smokeless tobacco: Never   Vaping Use    Vaping status: Never Used   Substance and Sexual Activity    Alcohol use: Yes     Alcohol/week: 7.0 standard drinks of alcohol     Types: 7 Standard drinks or equivalent per week     Comment: cage 2/21/17    Drug use: No   Other Topics Concern    Exercise Yes    Stress Concern Yes              Review of Systems    Positive for stated complaint: Cold - Sinus infection symptoms  Other systems are as noted in HPI.  Constitutional and vital signs reviewed.      All other systems reviewed and negative except as noted above.    Physical Exam     ED Triage Vitals [12/13/24 0948]   /77   Pulse 103   Resp 16   Temp 97.5 °F (36.4 °C)   Temp src Oral   SpO2 98 %   O2 Device None (Room air)       Current Vitals:   Vital Signs  BP: 125/77  Pulse: 103  Resp: 16  Temp: 97.5 °F (36.4 °C)  Temp src: Oral    Oxygen Therapy  SpO2: 98 %  O2 Device: None (Room air)        Physical Exam  Vitals and nursing note reviewed.   Constitutional:       Appearance: Normal appearance. He is well-developed.   HENT:      Head: Normocephalic and atraumatic.      Right Ear: Tympanic membrane, ear canal and external ear normal.      Ears:      Comments: Left TM erythema     Nose: Congestion present.      Mouth/Throat:      Mouth: Mucous membranes are moist.      Pharynx:  Oropharynx is clear.   Cardiovascular:      Rate and Rhythm: Normal rate and regular rhythm.      Heart sounds: Normal heart sounds.   Pulmonary:      Effort: Pulmonary effort is normal. No respiratory distress.      Breath sounds: Normal breath sounds.   Musculoskeletal:      Cervical back: Neck supple.   Skin:     General: Skin is warm and dry.      Capillary Refill: Capillary refill takes less than 2 seconds.   Neurological:      General: No focal deficit present.      Mental Status: He is alert.   Psychiatric:         Mood and Affect: Mood normal.         Behavior: Behavior normal.            ED Course   Labs Reviewed - No data to display                MDM           Medical Decision Making  Viral vs bacterial sinus and ear infection. Timeframe and exam concerning for bacterial etiology. Discharge on augmentin and flonase   As well as ibuprofen for pain  Disposition and Plan     Clinical Impression:  1. Acute non-recurrent frontal sinusitis    2. Non-recurrent acute suppurative otitis media of left ear without spontaneous rupture of tympanic membrane         Disposition:  Discharge  12/13/2024 10:01 am    Follow-up:  Serge Mayorga MD  [address] [place] [address]  [phone]      As needed          Medications Prescribed:  Discharge Medication List as of 12/13/2024 10:13 AM        START taking these medications    Details   amoxicillin clavulanate 875-125 MG Oral Tab Take 1 tablet by mouth 2 (two) times daily for 7 days., Normal, Disp-14 tablet, R-0                 Supplementary Documentation:

## 2024-12-13 NOTE — TELEPHONE ENCOUNTER
Called and spoke to pt. Offered appointment this afternoon, pt stated he ended up going to [facility] and has already gotten meds.

## 2024-12-13 NOTE — ED INITIAL ASSESSMENT (HPI)
Pt presents with congestion and sinus pressure, L ear pain, starting 3 weeks ago with a cold, sputum is now green

## 2024-12-13 NOTE — PROGRESS NOTES
Ciaran Rhodes is a 44 year old male.  HPI:   See answers to questions above.     No current outpatient medications on file.      No past medical history on file.   No past surgical history on file.   Family History   Problem Relation Age of Onset    Hypertension Mother     Lipids Mother     Other (Other) Mother         depression, cva    Heart Disorder Paternal Grandfather     Cancer Paternal Grandfather         bladder ca      Social History:  Social History     Socioeconomic History    Marital status:    Tobacco Use    Smoking status: Never     Passive exposure: Never    Smokeless tobacco: Never   Vaping Use    Vaping status: Never Used   Substance and Sexual Activity    Alcohol use: Yes     Alcohol/week: 7.0 standard drinks of alcohol     Types: 7 Standard drinks or equivalent per week     Comment: cage 2/21/17    Drug use: No   Other Topics Concern    Exercise Yes    Stress Concern Yes         ASSESSMENT AND PLAN:     Encounter Diagnosis   Name Primary?    Administrative encounter Yes       Being seen at [facility].     Meds & Refills for this Visit:  Requested Prescriptions      No prescriptions requested or ordered in this encounter       Duration of  the service:  5 minutes

## 2025-03-28 NOTE — TELEPHONE ENCOUNTER
Student's Name:   Yadira Torres Birth Date  2012  Sex  female  Race/Ethnicity  [employer]/Grade Level/ID#     Address:   [address] [place] [address] [place]  [place] [address]    ________________________________          _________________          ___________________  Parent/Guardian                                               Telephone# Home:             Work:   HEALTH HISTORY: MUST BE COMPLETED AND SIGNED BY PARENT/GUARDIAN AND VERIFIED BY HEALTH CARE PROVIDER  ALLERGIES: (Food, drug, insect, other) has No Known Allergies.   MEDICATION: (List all prescribed or taken on a regular basis.) has a current medication list which includes the following prescription(s): benzoyl peroxide 5 % gel.   Diagnosis of asthma?   []  Yes   []  No  Loss of function of one of paired  organs?(eye/ear/kidney/testicle) []  Yes    []  No    Child wakes during night coughing? [] Yes    []  No  Hospitalizations? []  Yes    []  No    Birth defects? []  Yes   []  No       When? What for?     Developmental delay? []  Yes   []  No  Surgery? (List all.)  []  Yes    []  No    Blood disorders? Hemophilia,  Sickle Cell, Other? Explain. []  Yes   []  No       When? What for?     Diabetes? []  Yes   []  No  Serious injury or illness? []  Yes    []  No    Head injury/Concussion/Passed out? []  Yes   []  No  TB skin test positive (past/present)? []  Yes*   []  No *If yes refer to local HD   Seizures? What are they like?  []  Yes   []  No  TB disease (past or present)? []  Yes*   []  No *If yes refer to local HD   Heart problem/Shortness of breath?  []  Yes   []  No  Tobacco use (type, frequency)?  []  Yes    []  No    Heart murmur/High blood pressure? []  Yes   []  No  Alcohol/Drug use?  []  Yes    []  No    Dizziness or chest pain with exercise? []  Yes   []  No  Family history of sudden death  before age 50? (Cause?) []  Yes    []  No    Eye/Vision problems? ___  []  Glasses []  Contacts Last exam by eye doctor __  Other concerns? (crossed eye, drooping  Labs placed per protocol   lids, squinting, difficulty reading) []  Dental    []  Braces    []  Bridge    []  Plate    []  Other  Additional information:   Ear/Hearing problems? []  Yes   []  No  Information may be shared with appropriate personnel for health and educational purposes.   Bone/Joint problem/injury/scoliosis? []  Yes   []  No  Parent/Guardian  Signatures:                                                                         Date   IMMUNIZATIONS: To be completed by health care provider. The mo/da/yr for every dose administered is required. If a specific vaccine is medically contraindicated, a separate written statement must be attached by the health care responsible for completing the health examination explaining the medical reason for the contraindication.   REQUIRED Vaccine/Dose  VACCINE/DOSE DATE DATE DATE DATE DATE   DTP or DTaP 2012 2012 2012 7/16/2013 10/17/2016   Tdap 4/13/2023       Polio (IPV) 2012 2012 2012 10/17/2016    Hib 2012 2012 2012 7/16/2013    Pneumococcal Conjugate 2012 2012 2012 4/16/2013    Hep B 2012 2012 2012     Hep A/Hep B        MMR 4/16/2013 10/17/2016      Measles        Mumps        Rubella        Varicella 4/16/2013 10/17/2016      Meningococcal conjugate (MCV4) 4/13/2023       Menigococcal B         RECOMMENDED, BUT NOT REQUIRED Vaccine/Dose  VACCINE/DOSE DATE DATE   Hepatitis A 4/16/2013 9/26/2014   HPV 4/13/2023 3/28/2024   Influenza 10/17/2016 10/20/2017    OTHER Vaccine/Dose   VACCINE/DOSE DATE DATE DATE   COVID 12/17/2021 1/7/2022 2/17/2023   Rotavirus 2012 2012 2012   RSV          Health care provider (MD, DO, APN, PA, school health professional, health official) verifying above immunization history must sign below. If adding dates to the above immunization history section, put your initials by date(s) and sign here.)  Electronically Signed by: Aron Montgomery CNP                                                       Date: 3/28/2025   Printed by Authority of the [facility] (COMPLETE BOTH SIDES)          12/23                [facility]          [facility]      Certificates of Nondenominational Exemption to Immunizations or Physician Medical Statements of Medical Contraindication Are Reviewed and Maintained by the School Authority.  ALTERNATIVE PROOF OF IMMUNITY   1. Clinical diagnosis (measles, mumps, hepatitis B) is allowed when verified by physician and supported with lab confirmation.  Attach copy of lab result.    * MEASLES ([place])  MO   DA  YR          **MUMPS  MO   DA  YR             HEPATITIS B  MO   DA   YR           VARICELLA  MO   DA  YR      2. History of varicella (chickenpox) disease is acceptable if verified by health care provider, school health professional or health official. Person signing below verifies that the parent/guardian’s description of varicella disease history is indicative of past infection and is accepting such history as documentation of disease.  Date of Disease                                  Signature                                                           Title   3. Laboratory Evidence of Immunity (check one)      []  Measles*     []  Mumps**     []  Rubella     []  Varicella   (Attach copy of lab result)         *All measles cases diagnosed on or after July 1, 2002, must be confirmed by laboratory evidence.      **All mumps cases diagnosed on or after July 1, 2013, must be confirmed by laboratory evidence.   Physician Statements of Immunity MUST be submitted to [facility] for review.  Completion of Alternative 1 or 3 MUST be accompanied by Labs & Physician Signature: ___________________________   PHYSICAL EXAMINATION REQUIREMENTS   Entire section below to be completed by MD/DO/APN/PA   Head Circumference if <2-3 years old - /62   Pulse 73   Temp 97.3 °F (36.3 °C) (Temporal)   Resp 18   Ht 4' 10.78\" (1.493 m)   Wt 42.1 kg (92 lb 14.8 oz)   BMI  18.91 kg/m²   BSA 1.33 m²    53.1 %ile (Z= 0.08) based on CDC (Girls, 2-20 Years) BMI-for-age based on BMI available on 3/28/2025.   DIABETES SCREENING (NOT REQUIRED FOR ) BMI>85% age/sex: No     And any two of the following: Family History: No  Ethnic Minority: yes    Signs of Insulin Resistance (hypertension, dyslipidemia, polycystic ovarian syndrome, acanthosis nigricans): No  At Risk: No   LEAD RISK QUESTIONNAIRE Required for children age 6 months through 6 years enrolled in licensed or public school operated day care, , nursery school and/or . (Blood test required if resides in [place] or high risk zip [facility].)  Questionnaire Administered? No  Blood Test Indicated? NA   Blood Test Date/Result:    TB SKIN OR BLOOD TEST Recommended only for children in high-risk groups including children immunosuppressed due to HIV infection or other conditions, frequent travel to or born in high prevalence countries or those exposed to adults in high-risk categories. See CDC guidelines. http://www.cdc.gov/tb/publications/factsheets/testing/TB_testing.htm.  Test Needed: No     Test performed: No                          Skin Test:    Date Read              /      /             Result:   Positive__      Negative__        mm________                          Blood Test: Date Reported       /      /               Result:  Positive__      Negative__      Value________  LAB TESTS (recommended) Date/Result SCREENINGS Date/Results   Hemoglobin or Hematocrit 12.7 (2/17/2023) Developmental Screening Tool N/A     Urinalysis NA Social and Emotional Screening Completed today   Sickle Cell (when indicated)  Other:         SYSTEM REVIEW Normal  Comments/Follow-up/Needs  Normal Comments/Follow-up/Needs   Skin  Yes  Endocrine Yes    Ears Yes                  Screening Result Gastrointestinal Yes    Eyes Yes                  Screening Result: Genito-Urinary Yes                                      LMP:    Nose Yes   Neurologic Yes    Throat Yes  Musculoskeletal Yes    Mouth/Dental Yes  Spinal Exam Yes    Cardiovascular/HTN Yes  Nutritional status Yes    Respiratory Yes Diagnosis of Asthma: No   Mental Health Yes    Currently Prescribed Asthma Medication:        No  Quick-relief medication (e.g. Short Acting Beta Antagonist)        No  Controller medication (e.g. inhaled corticosteroid) Other     NEEDS/MODIFICATIONS required in the school setting: No restrictions DIETARY Needs/Restrictions: No restrictions   SPECIAL INSTRUCTIONS/DEVICES e.g. safety glasses, glass eye, chest protector for arrhythmia, pacemaker, prosthetic device, dental bridge, false teeth, athletic support/cup: No restrictions   MENTAL HEALTH/OTHER Is there anything else the school should know about this student?  If you would like to discuss this student’s health with school or school health personnel:  Not needed   EMERGENCY ACTION needed while at school due to child’s health condition (e.g. ,seizures, asthma, insect sting, food, peanut allergy, bleeding problem, diabetes, heart problem)?   No   On the basis of the examination on this day, I approve this child’s participation in                                (If No or Modified please attach explanation.)  PHYSICAL EDUCATION:  Yes                               INTERSCHOLASTIC SPORTS   Yes   Electronically Signed by: Aron Montgomery CNP                                                      Date: 3/28/2025   Address:  [facility] [address] [place] [address]  [facility] [address]  [address] [place] [id number] [phone]   Printed by Authority of the [facility] (COMPLETE BOTH SIDES)          12/23                [facility]          Approved [facility] 5/2024

## 2025-05-12 ENCOUNTER — OFFICE VISIT (OUTPATIENT)
Age: 45
End: 2025-05-12
Payer: COMMERCIAL

## 2025-05-12 ENCOUNTER — NURSE TRIAGE (OUTPATIENT)
Dept: INTERNAL MEDICINE CLINIC | Facility: CLINIC | Age: 45
End: 2025-05-12

## 2025-05-12 VITALS
HEIGHT: 71 IN | HEART RATE: 98 BPM | DIASTOLIC BLOOD PRESSURE: 74 MMHG | WEIGHT: 184.63 LBS | BODY MASS INDEX: 25.85 KG/M2 | SYSTOLIC BLOOD PRESSURE: 118 MMHG

## 2025-05-12 DIAGNOSIS — G89.29 CHRONIC MIDLINE LOW BACK PAIN WITH BILATERAL SCIATICA: Primary | ICD-10-CM

## 2025-05-12 DIAGNOSIS — M54.42 CHRONIC MIDLINE LOW BACK PAIN WITH BILATERAL SCIATICA: Primary | ICD-10-CM

## 2025-05-12 DIAGNOSIS — M54.41 CHRONIC MIDLINE LOW BACK PAIN WITH BILATERAL SCIATICA: Primary | ICD-10-CM

## 2025-05-12 PROCEDURE — 99214 OFFICE O/P EST MOD 30 MIN: CPT | Performed by: FAMILY MEDICINE

## 2025-05-12 RX ORDER — CYCLOBENZAPRINE HCL 10 MG
10 TABLET ORAL 3 TIMES DAILY
Qty: 45 TABLET | Refills: 0 | Status: SHIPPED | OUTPATIENT
Start: 2025-05-12 | End: 2025-05-27

## 2025-05-12 RX ORDER — METHYLPREDNISOLONE 4 MG/1
TABLET ORAL
Qty: 1 EACH | Refills: 0 | Status: SHIPPED | OUTPATIENT
Start: 2025-05-12

## 2025-05-12 NOTE — TELEPHONE ENCOUNTER
Action Requested: Summary for Provider     []  Critical Lab, Recommendations Needed  [] Need Additional Advice  []   FYI    []   Need Orders  [] Need Medications Sent to Pharmacy  []  Other     SUMMARY: Received call from pt with lower back for past few weeks. Pain radiating into legs bilaterally. No known injury. Patient has normal bowel/bladder function. Had been taking ibuprofen and alternating heat/ice with minimal improvement. Patient has appointment scheduled with Dr. Mayorga tomorrow but asking what he can do in meantime. Dr. Mayorga had cancellation today, offered to pt. Patient agreeable, advised can alternate ibuprofen and tylenol in meantime. Patient stated understanding and was appreciative.     Reason for call: Back Pain  Onset: few weeks       Reason for Disposition   SEVERE back pain (e.g., excruciating, unable to do any normal activities) and not improved after pain medicine and CARE ADVICE    Protocols used: Back Pain-A-[place]

## 2025-05-12 NOTE — PROGRESS NOTES
Ciaran Rhodes  11/17/1980    Chief Complaint   Patient presents with    Back Pain     Lower back pain        HPI:   Ciaran Rhodes is a 44 year old male who presents for evaluation of chronic intermittent back pain that began again a few weeks ago. No trauma or injury prior. Is having sharp pain and stiffness. Has used alternating heat and ice. Has used Ibuprofen.  He does endorse radiation of pain through his bilateral lower extremities.    Current Medications[1]   Allergies[2]   Past Medical History[3]   Problem List[4]   Past Surgical History[5]   Family History[6]   Short Social Hx on File[7]      REVIEW OF SYSTEMS:   GENERAL: feels well otherwise, no recent illness    EXAM:   /74   Pulse 98   Ht 5' 11\" (1.803 m)   Wt 184 lb 9.6 oz (83.7 kg)   BMI 25.75 kg/m²   GENERAL: Well developed, well nourished,in no apparent distress  MSK: Evaluation of patient's lumbar spine reveals lower lumbar paraspinal hypertonicity.  No significant pain with facet loading.  Equivocal neural tension on exam.    ASSESSMENT AND PLAN:   Ciaran Rhodes is a 44 year old male who presents with for evaluation    Chronic midline low back pain with bilateral sciatica  He is experience intermittent episodes of back pain over the years with a recent exacerbation of symptoms.  Will begin evaluation with radiographs.  Discussed treating his current symptoms with an oral steroid course and as needed nightly muscle relaxant.  We discussed beginning physical therapy and the expected course of improvement of his current symptoms.  We will discuss the results of his radiographs over the phone and he will follow-up if symptoms do not improve after PT  - XR LUMBAR SPINE (MIN 4 VIEWS) (CPT=72110); Future  - methylPREDNISolone (MEDROL) 4 MG Oral Tablet Therapy Pack; As directed.  Dispense: 1 each; Refill: 0  - cyclobenzaprine 10 MG Oral Tab; Take 1 tablet (10 mg total) by mouth 3 (three) times daily for 15 days.  Dispense: 45 tablet; Refill: 0  -  OP REFERRAL TO [facility]        All questions were answered and the patient agrees with the plan.     Thank you,  Serge Mayorga MD         [1]   No current outpatient medications on file.   [2] No Known Allergies  [3] History reviewed. No pertinent past medical history.  [4]   Patient Active Problem List  Diagnosis   (none) - all problems resolved or deleted   [5] History reviewed. No pertinent surgical history.  [6]   Family History  Problem Relation Age of Onset    Hypertension Mother     Lipids Mother     Other (Other) Mother         depression, cva    Heart Disorder Paternal Grandfather     Cancer Paternal Grandfather         bladder ca   [7]   Social History  Socioeconomic History    Marital status:    Tobacco Use    Smoking status: Never     Passive exposure: Never    Smokeless tobacco: Never   Vaping Use    Vaping status: Never Used   Substance and Sexual Activity    Alcohol use: Yes     Alcohol/week: 7.0 standard drinks of alcohol     Types: 7 Standard drinks or equivalent per week     Comment: socially    Drug use: No   Other Topics Concern    Caffeine Concern Yes    Exercise Yes    Seat Belt No    Stress Concern Yes

## 2025-05-13 ENCOUNTER — OFFICE VISIT (OUTPATIENT)
Dept: PHYSICAL THERAPY | Age: 45
End: 2025-05-13
Attending: FAMILY MEDICINE
Payer: COMMERCIAL

## 2025-05-13 ENCOUNTER — HOSPITAL ENCOUNTER (OUTPATIENT)
Dept: GENERAL RADIOLOGY | Facility: HOSPITAL | Age: 45
Discharge: HOME | End: 2025-05-13
Attending: FAMILY MEDICINE
Payer: COMMERCIAL

## 2025-05-13 ENCOUNTER — TELEPHONE (OUTPATIENT)
Dept: PHYSICAL THERAPY | Facility: HOSPITAL | Age: 45
End: 2025-05-13

## 2025-05-13 DIAGNOSIS — M54.42 CHRONIC MIDLINE LOW BACK PAIN WITH BILATERAL SCIATICA: ICD-10-CM

## 2025-05-13 DIAGNOSIS — G89.29 CHRONIC MIDLINE LOW BACK PAIN WITH BILATERAL SCIATICA: ICD-10-CM

## 2025-05-13 DIAGNOSIS — M54.41 CHRONIC MIDLINE LOW BACK PAIN WITH BILATERAL SCIATICA: Primary | ICD-10-CM

## 2025-05-13 DIAGNOSIS — M54.42 CHRONIC MIDLINE LOW BACK PAIN WITH BILATERAL SCIATICA: Primary | ICD-10-CM

## 2025-05-13 DIAGNOSIS — M54.41 CHRONIC MIDLINE LOW BACK PAIN WITH BILATERAL SCIATICA: ICD-10-CM

## 2025-05-13 DIAGNOSIS — G89.29 CHRONIC MIDLINE LOW BACK PAIN WITH BILATERAL SCIATICA: Primary | ICD-10-CM

## 2025-05-13 PROCEDURE — 72110 X-RAY EXAM L-2 SPINE 4/>VWS: CPT | Performed by: FAMILY MEDICINE

## 2025-05-13 PROCEDURE — 97162 PT EVAL MOD COMPLEX 30 MIN: CPT

## 2025-05-13 PROCEDURE — 97110 THERAPEUTIC EXERCISES: CPT

## 2025-05-13 PROCEDURE — 97140 MANUAL THERAPY 1/> REGIONS: CPT

## 2025-05-13 NOTE — PROGRESS NOTES
SPINE EVALUATION:     Diagnosis:   Chronic midline low back pain with bilateral sciatica Patient:  Ciaran Rhodes (44 year old, male)        Referring Provider: Serge Mayorga  Today's Date   5/13/2025    Precautions:  None   Date of Evaluation: 05/13/25  Next MD visit: No data recorded  Date of Surgery: none     PATIENT SUMMARY   Summary of chief complaints: bilateral or sometimes R>L radicular pain  History of current condition: Pt. reports about 2-3 weeks ago he had lower back pain. Nothing caused it but he noted stiffness. He had muscle spasms and decreased mobility. He took ibuprofen and used ice/heat. Over time it has improved. The last weekend he was doing work in the hard and he got shooting pain. Pt. consulted with the MD and got an x-ray this morning. Pt. took muscle relaxer and is on steroids now.   Pain level: current 5 /10, at best 3 /10, at worst 10 /10  Description of symptoms: sharp pain, shooting pain down one or both LE.   Occupation: standing desk and sitting desk job   Leisure activities/Hobbies: working out, walking   Prior level of function: independent  Current limitations: pain with sitting, bending, twisting, sleeping, standing, walking  Pt goals: be painfree throughout day  Red flag signs/symptoms: Pt denies changes in bowel/bladder function, saddle anesthesia; Pt denies dizziness, drop attacks, dysphagia, dysarthria, diplopia; Pt denies pain that wakes in sleep, fever, recent trauma, history of CA, pain unchanged with movement/activity    Past medical history was reviewed with Ciaran.  Significant findings include: none  Imaging/Tests: x-rays (normal lordosis, vertebral bodies wnl, mild facet arthropathy L5-S1).   Ciaran  has no past medical history on file.  He  has no past surgical history on file.    ASSESSMENT  Ciaran presents to physical therapy evaluation with primary c/o bilateral or sometimes R>L radicular pain. The results of the objective tests and measures show patient seems to  respond positively to directional preference (extension) in regards to familiar lumbar/radicular symptoms.. Functional deficits include but are not limited to pain with sitting, bending, twisting, sleeping, standing, walking. Signs and symptoms are consistent with diagnosis of Chronic midline low back pain with bilateral sciatica. Pt and PT discussed evaluation findings, pathology, POC and HEP.  Pt voiced understanding and performs HEP correctly without reported pain. Skilled Physical Therapy is medically necessary to address the above impairments and reach functional goals.    OBJECTIVE:    Musculoskeletal:  Observation/Posture: forward head posture; rounded shoulders   Accessory Motion:  L3-4 UPA restriction; no pain with UPA to L5-S1 joint pockets bilat   Palpation: increased L>R tone in lumbar paraspinals     Special tests:   Flexion-severe limitation extension-severe with pain SB R-moderate with more pain SB L- moderate Rot R-moderate Rot L-wnl; L>R weakness toe walk WNL heel walk (NOTE: improved toe walk post prone extension directional preference exercises)     ROM and Strength:  (* denotes performed with pain)  Hip   ROM MMT (-/5)    R L R L     Flex (L2)     4-/5 4-/5     Ext              Abd     4-/5 3+/5             Today's Treatment and Response:   Pt education was provided on exam findings, treatment diagnosis, treatment plan, expectations, and prognosis.  Today's Treatment       5/13/2025   Spine Treatment   Therapeutic Exercise 2x60 sec prone on elbows  3x5 prone pressups throughout session  2x10 prone hip IR/ER     Neuro Re-Education 2x10 prone glute squeeze (bilat and isolated)   Manual Therapy L QL and lumbar PS 10 min STM   Therapeutic Exercise Minutes 15   Neuro Re-Educ Minutes 3   Manual Therapy Minutes 10   Evaluation Minutes 16   Total Time Of Timed Procedures 28   Total Time Of Service-Based Procedures 16   Total Treatment Time 44   HEP 2x60 sec prone on elbows  3x5 prone pressups throughout  session  2x10 prone hip IR/ER  2x10 prone glute squeeze (bilat and isolated)        Patient was instructed in and issued a HEP for: 2x60 sec prone on elbows  3x5 prone pressups throughout session  2x10 prone hip IR/ER  2x10 prone glute squeeze (bilat and isolated)    Charges:  PT EVAL: Moderate Complexity, 1 Therex 1 Manual  In agreement with evaluation findings and clinical rationale, this evaluation involved MODERATE COMPLEXITY decision making due to 1-2 personal factors/comorbidities, 3 or more body structures involved/activity limitations, and evolving symptoms as documented in the evaluation.                                                                         PLAN OF CARE:    Goals: (to be met in 10 visits)   Pt. To tolerate 20 minutes of sitting to ensure improved sitting ability for work related tasks.  Pt. To improve lumbar extension by 25% to ensure adequate mobility available to maintain upright posture during standing activities.  Pt. To demonstrate 10 floor to waist transfers using 10 lbs and correct body mechanics to ensure safety with picking up groceries from off the floor.  Pt. To be independent with home exercise program.  Pt. To have QUE score of less than 30% disability.     Frequency / Duration: Patient will be seen 2x/week or a total of 10  visits over a 90 day period. Treatment will include: Manual Therapy; Mechanical Traction; Neuromuscular Re-education; Home Exercise Program instruction; Therapeutic Exercise; Therapeutic Activities; Electrical stimulation (attended); Patient/Family Education    Education or treatment limitation: Compliance   Rehab Potential: good     Oswestry Disability Index Score  Score: (Patient-Rptd) 54 % (5/13/2025  5:21 PM)      Patient/Family/Caregiver was advised of these findings, precautions, and treatment options and has agreed to actively participate in planning and for this course of care.    Thank you for your referral. Please co-sign or sign and return this  letter via fax as soon as possible to [phone]. If you have any questions, please contact me at Dept: [phone]    Sincerely,  Electronically signed by therapist: Isabel Charles PT  Physician's certification required: Yes  I certify the need for these services furnished under this plan of treatment and while under my care.    X___________________________________________________ Date____________________    Certification From: 5/13/2025  To:8/11/2025

## 2025-05-19 ENCOUNTER — OFFICE VISIT (OUTPATIENT)
Dept: PHYSICAL THERAPY | Age: 45
End: 2025-05-19
Attending: FAMILY MEDICINE
Payer: COMMERCIAL

## 2025-05-19 PROCEDURE — 97110 THERAPEUTIC EXERCISES: CPT

## 2025-05-19 PROCEDURE — 97140 MANUAL THERAPY 1/> REGIONS: CPT

## 2025-05-20 NOTE — PROGRESS NOTES
Patient: Ciaran Rhodes (44 year old, male) Referring Provider:  Insurance:   Diagnosis: Chronic midline low back pain with bilateral sciatica Serge Mayorga  [facility]   Date of Surgery: none Next MD visit:  N/A   Precautions:  None No data recorded Referral Information:    Date of Evaluation: Req: 0, Auth: 0, Exp:     05/13/25 POC Auth Visits:          Today's Date   5/19/2025    Subjective  Pt. reports he felt fine after the first session. No immediate changes. But today he reports was the first time he felt better. He had slight lateral R sided localized pain, but the exercises have been helping.       Pain: 3/10     Objective  improved lumbar extension by 25%            Assessment  Pt. has improved lumbar extension marked by ability to extend past neutral in standing position and improved form during prone pressups on table. Continued manual interventions to alleviate tension and tone along affected musculature. Progressed clinical exercises conservatively, adding only s/l hip abduction and bridges today. Pt. responded well to first full session of treatment.    Goals (to be met in 10 visits)   Pt. To tolerate 20 minutes of sitting to ensure improved sitting ability for work related tasks.  Pt. To improve lumbar extension by 25% to ensure adequate mobility available to maintain upright posture during standing activities.  Pt. To demonstrate 10 floor to waist transfers using 10 lbs and correct body mechanics to ensure safety with picking up groceries from off the floor.  Pt. To be independent with home exercise program.  Pt. To have QUE score of less than 30% disability.       Plan  Progress hip/glute and core stabilization. Continue with directional preference (extension)    Treatment Last 4 Visits  Treatment Day: 2       5/13/2025 5/19/2025   Spine Treatment   Therapeutic Exercise 2x60 sec prone on elbows  3x5 prone pressups throughout session  2x10 prone hip IR/ER   2x60 sec prone on elbows   3x5-10  prone pressups throughout session     2x10 prone hip IR/ER   2x10 s/l hip abduction  2x10 bridges     Neuro Re-Education 2x10 prone glute squeeze (bilat and isolated) 2x10 prone glute squeeze (bilat and isolated)   Manual Therapy L QL and lumbar PS 10 min STM R>L QL and lumbar PS 16 min STM   Therapeutic Exercise Minutes 15 24   Neuro Re-Educ Minutes 3 3   Manual Therapy Minutes 10 15   Evaluation Minutes 16    Total Time Of Timed Procedures 28 42   Total Time Of Service-Based Procedures 16 0   Total Treatment Time 44 42   HEP 2x60 sec prone on elbows  3x5 prone pressups throughout session  2x10 prone hip IR/ER  2x10 prone glute squeeze (bilat and isolated)         HEP  2x60 sec prone on elbows  3x5 prone pressups throughout session  2x10 prone hip IR/ER  2x10 prone glute squeeze (bilat and isolated)    Charges  2 Therex 1 Manual

## 2025-05-22 ENCOUNTER — TELEPHONE (OUTPATIENT)
Dept: PHYSICAL THERAPY | Facility: HOSPITAL | Age: 45
End: 2025-05-22

## 2025-06-06 ENCOUNTER — OFFICE VISIT (OUTPATIENT)
Dept: PHYSICAL THERAPY | Age: 45
End: 2025-06-06
Attending: FAMILY MEDICINE
Payer: COMMERCIAL

## 2025-06-06 PROCEDURE — 97140 MANUAL THERAPY 1/> REGIONS: CPT

## 2025-06-06 PROCEDURE — 97110 THERAPEUTIC EXERCISES: CPT

## 2025-06-06 NOTE — PROGRESS NOTES
Patient: Ciaran Rhodes (44 year old, male) Referring Provider:  Insurance:   Diagnosis: Chronic midline low back pain with bilateral sciatica Serge Mayorga  [facility]   Date of Surgery: none Next MD visit:  N/A   Precautions:  None No data recorded Referral Information:    Date of Evaluation: Req: 0, Auth: 0, Exp:     05/13/25 POC Auth Visits:          Today's Date   6/6/2025    Subjective  Pt. no longer has nerve pain or pinching. Pain is around 0-1/10.       Pain: 1/10     Objective          Improved lumbar extension by 50%     Assessment    Used less pressure during manual therapy per patient preference. Used joint mobilizations to work on restricted segments limiting full extension mobility. Noted pt. Had improved lumbar extension when performing prone pressups. Progressed clinical exercises and discussed higher level goals (moderate weight lifting for fitness and running) so that exercises can be adjusted in future to work towards those goals.    Goals (to be met in 10 visits)   Pt. To tolerate 20 minutes of sitting to ensure improved sitting ability for work related tasks.  Pt. To improve lumbar extension by 25% to ensure adequate mobility available to maintain upright posture during standing activities.  Pt. To demonstrate 10 floor to waist transfers using 10 lbs and correct body mechanics to ensure safety with picking up groceries from off the floor.  Pt. To be independent with home exercise program.  Pt. To have QUE score of less than 30% disability.    Goal 1 met, Goal 2 met as of 6/6/2025     Plan  Progress hip/glute and core stabilization. Continue with directional preference (extension)    Treatment Last 4 Visits  Treatment Day: 3       5/13/2025 5/19/2025 6/6/2025   Spine Treatment   Therapeutic Exercise 2x60 sec prone on elbows  3x5 prone pressups throughout session  2x10 prone hip IR/ER   2x60 sec prone on elbows   3x5-10 prone pressups throughout session     2x10 prone hip IR/ER   2x10  s/l hip abduction  2x10 bridges   3x10 prone pressups throughout session    2x10 prone hip IR/ER  2x10 bridge w/ march  2x10 bridge w/ SLR    10 SLR squat chair     Neuro Re-Education 2x10 prone glute squeeze (bilat and isolated) 2x10 prone glute squeeze (bilat and isolated) 10 bird dog  2x10 clams in side plank   Manual Therapy L QL and lumbar PS 10 min STM R>L QL and lumbar PS 16 min STM R>L QL and lumbar PS 10 min STM  5 min (30 sec bouts L UPA lumbar spine grade II-III)       Therapeutic Exercise Minutes 15 24 23   Neuro Re-Educ Minutes 3 3 5   Manual Therapy Minutes 10 15 15   Evaluation Minutes 16     Total Time Of Timed Procedures 28 42 43   Total Time Of Service-Based Procedures 16 0 0   Total Treatment Time 44 42 43   HEP 2x60 sec prone on elbows  3x5 prone pressups throughout session  2x10 prone hip IR/ER  2x10 prone glute squeeze (bilat and isolated)          HEP  2x60 sec prone on elbows  3x5 prone pressups throughout session  2x10 prone hip IR/ER  2x10 prone glute squeeze (bilat and isolated)    Charges  2 therex 1 manual

## 2025-06-13 ENCOUNTER — APPOINTMENT (OUTPATIENT)
Dept: PHYSICAL THERAPY | Age: 45
End: 2025-06-13
Attending: FAMILY MEDICINE
Payer: COMMERCIAL

## 2025-06-16 ENCOUNTER — OFFICE VISIT (OUTPATIENT)
Dept: PHYSICAL THERAPY | Age: 45
End: 2025-06-16
Attending: FAMILY MEDICINE
Payer: COMMERCIAL

## 2025-06-16 PROCEDURE — 97530 THERAPEUTIC ACTIVITIES: CPT

## 2025-06-16 PROCEDURE — 97110 THERAPEUTIC EXERCISES: CPT

## 2025-06-16 NOTE — PROGRESS NOTES
Patient: Ciaran Rhodes (44 year old, male) Referring Provider:  Insurance:   Diagnosis: Chronic midline low back pain with bilateral sciatica Serge Mayorga  [facility]   Date of Surgery: none Next MD visit:  N/A   Precautions:  None No data recorded Referral Information:    Date of Evaluation: Req: 0, Auth: 0, Exp:     05/13/25 POC Auth Visits:          Today's Date   6/16/2025    Subjective  Pt. reports he has been feeling really good. Pt. feels some tenderness with some things. Most of the time he has no pain. He does get pressure/stiffness with some exercises, certain motions or lifting. Sitting is a lot better and pt. denies pressure in lower lumbar spine.       Pain: 0/10     Objective  Improved squatting mechanics marked by minimal lumbar flexion at end range and good maintenance of bilat knee alignment with cuing            Assessment  Did no perform manual today as symptoms were minimal to none. Focused on self management of pain techniques instead, and building stability for safe return to activities such as lifting and running. Pt. performed well and denied any significant increase in symptoms throughout. Educated pt. on how to begin working towards return to running and modifying impact activities.    Goals (to be met in 10 visits)   Pt. To tolerate 20 minutes of sitting to ensure improved sitting ability for work related tasks.  Pt. To improve lumbar extension by 25% to ensure adequate mobility available to maintain upright posture during standing activities.  Pt. To demonstrate 10 floor to waist transfers using 10 lbs and correct body mechanics to ensure safety with picking up groceries from off the floor.  Pt. To be independent with home exercise program.  Pt. To have QUE score of less than 30% disability.    Goal 1 met, Goal 2 met as of 6/6/2025  Goal 3 met 6/16/2025  Goal 4 progressing     Plan  Progress hip/glute and core stabilization. Continue with directional preference  (extension)    Treatment Last 4 Visits  Treatment Day: 4       5/13/2025 5/19/2025 6/6/2025 6/16/2025   Spine Treatment   Therapeutic Exercise 2x60 sec prone on elbows  3x5 prone pressups throughout session  2x10 prone hip IR/ER   2x60 sec prone on elbows   3x5-10 prone pressups throughout session     2x10 prone hip IR/ER   2x10 s/l hip abduction  2x10 bridges   3x10 prone pressups throughout session    2x10 prone hip IR/ER  2x10 bridge w/ march  2x10 bridge w/ SLR    10 SLR squat chair   60 sec prone on elbows    3x10 prone pressups throughout session     2x10 prone hip IR/ER   2x10 bridge w/ march   2x10 bridge w/ SLR     10 SLR squat chair     2x10 SL calf raise  2x10 posterior lunge  2x10 lateral lunge    Step up w/ march 2x10 on 8 inch +OH press using med ball    10 lateral step down 8 inch step w/ march   Neuro Re-Education 2x10 prone glute squeeze (bilat and isolated) 2x10 prone glute squeeze (bilat and isolated) 10 bird dog  2x10 clams in side plank 10 bird dog  2x10 clams in side plank        Manual Therapy L QL and lumbar PS 10 min STM R>L QL and lumbar PS 16 min STM R>L QL and lumbar PS 10 min STM  5 min (30 sec bouts L UPA lumbar spine grade II-III)        Therapeutic Activity    2x15 pallof press 25 lbs  2x10 lift to press 10 lbs  2x10 suitcase squats 10/10 lbs unilat hold     Therapeutic Exercise Minutes 15 24 23 25   Neuro Re-Educ Minutes 3 3 5 4   Manual Therapy Minutes 10 15 15    Therapeutic Activity Minutes    15   Evaluation Minutes 16      Total Time Of Timed Procedures 28 42 43 44   Total Time Of Service-Based Procedures 16 0 0 0   Total Treatment Time 44 42 43 44   HEP 2x60 sec prone on elbows  3x5 prone pressups throughout session  2x10 prone hip IR/ER  2x10 prone glute squeeze (bilat and isolated)           HEP  2x60 sec prone on elbows  3x5 prone pressups throughout session  2x10 prone hip IR/ER  2x10 prone glute squeeze (bilat and isolated)    Charges  2 therex 1 theract

## 2025-06-27 ENCOUNTER — APPOINTMENT (OUTPATIENT)
Dept: PHYSICAL THERAPY | Age: 45
End: 2025-06-27
Attending: FAMILY MEDICINE
Payer: COMMERCIAL

## 2025-07-08 ENCOUNTER — DOCUMENTATION ONLY (OUTPATIENT)
Dept: PHYSICAL THERAPY | Age: 45
End: 2025-07-08

## 2025-07-08 NOTE — PROGRESS NOTES
Patient: Ciaran Rhodes (44 year old, male) Referring Provider:  Insurance:   Diagnosis: Chronic midline low back pain with bilateral sciatica No ref. provider found  N/A   Date of Surgery: none Next MD visit:  N/A   Precautions:  None No data recorded Referral Information:    Date of Evaluation: Req: 0, Auth: 0, Exp:     05/13/25 POC Auth Visits:        Discharge Summary  Pt has attended 4 visits in Physical Therapy.     Today's Date   7/8/2025       Musculoskeletal:  Observation/Posture: forward head posture; rounded shoulders   Accessory Motion:  L3-4 UPA restriction; no pain with UPA to L5-S1 joint pockets bilat   Palpation: increased L>R tone in lumbar paraspinals     Special tests:   Flexion-severe limitation extension-severe with pain SB R-moderate with more pain SB L- moderate Rot R-moderate Rot L-wnl; L>R weakness toe walk WNL heel walk (NOTE: improved toe walk post prone extension directional preference exercises)     ROM and Strength:  (* denotes performed with pain)  Hip   ROM MMT (-/5)    R L R L     Flex (L2)     4-/5 4-/5     Ext              Abd     4-/5 3+/5                 Assessment   Unable to update objective measures due to discharge earlier than anticipated. Patient was last seen 6/12/25 and cancelled last follow up appointment. Pt. Appropriate for transition to HEP so long as no increase in symptoms occur.    Goals (to be met in 10 visits)   Pt. To tolerate 20 minutes of sitting to ensure improved sitting ability for work related tasks.  Pt. To improve lumbar extension by 25% to ensure adequate mobility available to maintain upright posture during standing activities.  Pt. To demonstrate 10 floor to waist transfers using 10 lbs and correct body mechanics to ensure safety with picking up groceries from off the floor.  Pt. To be independent with home exercise program.  Pt. To have QUE score of less than 30% disability.    Goal 1 met, Goal 2 met as of 6/6/2025  Goal 3 met 6/16/2025  Goal 4  progressing      Oswestry Disability Index Score  Score: (Patient-Rptd) 54 % (5/13/2025  5:21 PM)      Plan: Discharge to [facility].    Patient/Family/Caregiver was advised of these findings, precautions, and treatment options and has agreed to actively participate in planning and for this course of care.    Thank you for your referral. If you have any questions, please contact me at Dept: [phone].    Sincerely,  Electronically signed by therapist: Isabel Charles PT     Physician's certification required:  Yes  Please co-sign or sign and return this letter via fax as soon as possible to [phone].   I certify the need for these services furnished under this plan of treatment and while under my care.    X___________________________________________________ Date____________________    Certification From: 7/8/2025  To:10/6/2025

## (undated) NOTE — MR AVS SNAPSHOT
[facility]  [address] [address] [phone]               Thank you for choosing us for your health care visit with Yanira Leach MD.  We are glad to serve you and happy to provide you with this summa schedule your appointment. Failure to obtain required authorization numbers can create reimbursement difficulties for you.       Assoc Dx:  Vasectomy evaluation [B01.94]          Reason for Today's Visit     Physical           Medical Issues Discussed Today

## (undated) NOTE — LETTER
05/25/21        [address] [place] [id number]      Dear Kelly Rahman,    Our records indicate that you have outstanding lab work and or testing that was ordered for you and has not yet been completed:  Orders Placed This Encounter      C